# Patient Record
Sex: FEMALE | Race: WHITE | NOT HISPANIC OR LATINO | URBAN - METROPOLITAN AREA
[De-identification: names, ages, dates, MRNs, and addresses within clinical notes are randomized per-mention and may not be internally consistent; named-entity substitution may affect disease eponyms.]

---

## 2017-03-08 ASSESSMENT — MIFFLIN-ST. JEOR: SCORE: 962.46

## 2017-03-09 ENCOUNTER — RECORDS - HEALTHEAST (OUTPATIENT)
Dept: CARDIOLOGY | Facility: CLINIC | Age: 82
End: 2017-03-09

## 2017-03-09 DIAGNOSIS — I50.9 CHF (CONGESTIVE HEART FAILURE) (H): ICD-10-CM

## 2017-03-09 DIAGNOSIS — R23.9 IMPAIRED SKIN INTEGRITY: ICD-10-CM

## 2017-03-09 DIAGNOSIS — R55 SYNCOPE, UNSPECIFIED SYNCOPE TYPE: ICD-10-CM

## 2017-03-09 LAB
AORTIC ROOT: 2.6 CM
AORTIC ROOT: 2.6 CM
AORTIC VALVE MEAN VELOCITY: 79.1 CM/S
ASCENDING AORTA: 3.3 CM
ASCENDING AORTA: 3.3 CM
ATRIAL RATE - MUSE: 127 BPM
AV DIMENSIONLESS INDEX VTI: 0.8
AV MEAN GRADIENT: 3 MMHG
AV PEAK GRADIENT: 5.4 MMHG
AV VALVE AREA: 2.6 CM2
AV VELOCITY RATIO: 0.8
BSA FOR ECHO PROCEDURE: 1.89 M2
CV BLOOD PRESSURE: NORMAL MMHG
CV ECHO HEIGHT: 66 IN
CV ECHO WEIGHT: 169 LBS
DIASTOLIC BLOOD PRESSURE - MUSE: NORMAL MMHG
DOP CALC AO PEAK VEL: 116 CM/S
DOP CALC AO VTI: 22.1 CM
DOP CALC LVOT AREA: 3.46 CM2
DOP CALC LVOT DIAMETER: 2.1 CM
DOP CALC LVOT PEAK VEL: 89.4 CM/S
DOP CALC LVOT STROKE VOLUME: 57.5 CM3
DOP CALCLVOT PEAK VEL VTI: 16.6 CM
ECHO EJECTION FRACTION ESTIMATED: 60 %
EJECTION FRACTION: 56 % (ref 55–75)
FRACTIONAL SHORTENING: 34.8 % (ref 28–44)
INTERPRETATION ECG - MUSE: NORMAL
INTERVENTRICULAR SEPTUM IN END DIASTOLE: 1.5 CM (ref 0.6–0.9)
IVS/PW RATIO: 1.1
LA AREA 1: 24.9 CM2
LA AREA 2: 18.9 CM2
LEFT ATRIUM LENGTH: 4.8 CM
LEFT ATRIUM SIZE: 4 CM
LEFT ATRIUM TO AORTIC ROOT RATIO: 1.54 NO UNITS
LEFT ATRIUM VOLUME INDEX: 44.1 ML/M2
LEFT ATRIUM VOLUME: 83.3 CM3
LEFT VENTRICLE CARDIAC INDEX: 2.2 L/MIN/M2
LEFT VENTRICLE CARDIAC OUTPUT: 4.2 L/MIN
LEFT VENTRICLE DIASTOLIC VOLUME INDEX: 30.2 CM3/M2 (ref 34–74)
LEFT VENTRICLE DIASTOLIC VOLUME: 57 CM3 (ref 46–106)
LEFT VENTRICLE HEART RATE: 73 BPM
LEFT VENTRICLE MASS INDEX: 153.9 G/M2
LEFT VENTRICLE SYSTOLIC VOLUME INDEX: 13.3 CM3/M2 (ref 11–31)
LEFT VENTRICLE SYSTOLIC VOLUME: 25.2 CM3 (ref 14–42)
LEFT VENTRICULAR INTERNAL DIMENSION IN DIASTOLE: 4.91 CM (ref 3.8–5.2)
LEFT VENTRICULAR INTERNAL DIMENSION IN SYSTOLE: 3.2 CM (ref 2.2–3.5)
LEFT VENTRICULAR MASS: 290.9 G
LEFT VENTRICULAR OUTFLOW TRACT MEAN GRADIENT: 2 MMHG
LEFT VENTRICULAR OUTFLOW TRACT MEAN VELOCITY: 59.5 CM/S
LEFT VENTRICULAR OUTFLOW TRACT PEAK GRADIENT: 3 MMHG
LEFT VENTRICULAR POSTERIOR WALL IN END DIASTOLE: 1.35 CM (ref 0.6–0.9)
LV STROKE VOLUME INDEX: 30.4 ML/M2
MITRAL VALVE E/A RATIO: 2.1
MV AVERAGE E/E' RATIO: 11.4 CM/S
MV DECELERATION TIME: 198 MS
MV E'TISSUE VEL-LAT: 11 CM/S
MV E'TISSUE VEL-MED: 5.66 CM/S
MV LATERAL E/E' RATIO: 8.7
MV MEDIAL E/E' RATIO: 16.8
MV PEAK A VELOCITY: 46.4 CM/S
MV PEAK E VELOCITY: 95.2 CM/S
NUC REST DIASTOLIC VOLUME INDEX: 2704 LBS
NUC REST SYSTOLIC VOLUME INDEX: 66 IN
P AXIS - MUSE: NORMAL DEGREES
PR INTERVAL - MUSE: NORMAL MS
QRS DURATION - MUSE: 98 MS
QT - MUSE: 350 MS
QTC - MUSE: 501 MS
R AXIS - MUSE: -10 DEGREES
SYSTOLIC BLOOD PRESSURE - MUSE: NORMAL MMHG
T AXIS - MUSE: 143 DEGREES
TRICUSPID REGURGITATION PEAK PRESSURE GRADIENT: 32.7 MMHG
TRICUSPID VALVE ANULAR PLANE SYSTOLIC EXCURSION: 1.6 CM
TRICUSPID VALVE PEAK REGURGITANT VELOCITY: 286 CM/S
VENTRICULAR RATE- MUSE: 123 BPM

## 2017-03-09 ASSESSMENT — MIFFLIN-ST. JEOR: SCORE: 1198.33

## 2017-03-10 ENCOUNTER — HOME CARE/HOSPICE - HEALTHEAST (OUTPATIENT)
Dept: HOME HEALTH SERVICES | Facility: HOME HEALTH | Age: 82
End: 2017-03-10

## 2017-03-10 ASSESSMENT — MIFFLIN-ST. JEOR: SCORE: 1189.26

## 2021-06-24 VITALS — HEIGHT: 66 IN | BODY MASS INDEX: 26.84 KG/M2 | WEIGHT: 167 LBS

## 2021-06-28 NOTE — PROGRESS NOTES
Call placed to [facility] and also to [facility] and discharge orders and summary faxed to both.

## 2021-06-28 NOTE — DISCHARGE SUMMARY
Physician Discharge Summary        Primary Care Physician:  Julia Abbott MD Admission Date: 3/8/2017   Discharge Provider: Juan Carlos Ybarra Discharge Date:  3/10/2017     Disposition: Final discharge disposition not confirmed Code Status: DNR   Activity: activity as tolerated Diet: cardiac diet   Date of service: 3/10/2017    Consult/s: none    Condition at Discharge: Stable      Discharge Diagnoses:    Principal Problem:    CHF (congestive heart failure)  Active Problems:    Atrial fibrillation with RVR    Vasovagal syncope    Acute respiratory failure with hypoxia    Leucocytosis      Other diagnoses:      Surgery: none    Significant Diagnostic Studies:      Ct Soft Tissue Neck Without Contrast    Result Date: 3/8/2017  [facility] CT SOFT TISSUE NECK WO CONTRAST 3/8/2017 11:22 PM INDICATION: Strangulation. TECHNIQUE: CT neck performed without IV contrast. Axial imaging with coronal and sagittal reconstruction. Dose reduction techniques were used. IV CONTRAST: None. COMPARISON: Head CT of 5/14/2016. No prior dedicated imaging of the soft tissues of the neck. FINDINGS: No definite soft tissue injury in the neck. The hyoid bone is intact. Thyroid cartilage is grossly intact. No definite cervical spine fracture. Visualized lung apices are grossly clear. Evaluation of the soft tissues limited in the absence of IV contrast though there is no definite neck mass or cervical lymphadenopathy. Medial location of both ICAs of the C2-C3 level should not be mistaken for submucosal masses. Visualized intracranial contents demonstrate mild to moderate volume loss though are poorly evaluated. Skull base is grossly unremarkable. Small fluid level in the left sphenoid sinus. Small volume right maxillary sinus. Visualized portions of the orbits  are normal.     CONCLUSION: 1.  No definite soft tissue neck injury. 2.  Within the limits of absence of IV contrast there is no definite neck mass or cervical  lymphadenopathy.    Ct Chest Without Contrast    Result Date: 3/8/2017  CT CHEST WO CONTRAST 3/8/2017 11:26 PM INDICATION: hypoxia TECHNIQUE: Routine chest. Dose reduction techniques were used. IV CONTRAST: None COMPARISON: None. FINDINGS: LUNGS AND PLEURA: Scarring and bronchiectasis of the left lower lobe. Trace bilateral pleural effusions. No pneumothorax. MEDIASTINUM: Moderately enlarged heart. Severe atherosclerotic vascular disease of the aorta and coronary arteries. No abdominal or retroperitoneal lymphadenopathy. LIMITED UPPER ABDOMEN: Renal cysts, the largest measuring 7.5 cm on the right. Cholelithiasis. MUSCULOSKELETAL: Negative.     CONCLUSION: 1.  Moderately enlarged heart. 2.  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 3.  Trace bilateral pleural effusions with dependent atelectasis. 4.  Scarring and bronchiectasis the left lower lobe.        Results from last 7 days  Lab Units 03/09/17  1502 03/08/17  2336   LN-WHITE BLOOD CELL COUNT thou/uL 9.7 15.3*   LN-HEMOGLOBIN g/dL 12.9 14.2   LN-HEMATOCRIT % 39.0 42.5   LN-PLATELET COUNT thou/uL 293 241   LN-NEUTROPHILS RELATIVE PERCENT % 68 83*   LN-MONOCYTES RELATIVE PERCENT % 7 5       Results from last 7 days  Lab Units 03/10/17  0541 03/09/17  0025   LN-INR  2.12* 1.99*       Results from last 7 days  Lab Units 03/10/17  0541 03/09/17  0424 03/08/17  2336   LN-SODIUM mmol/L  --   --  142   LN-POTASSIUM mmol/L 3.9 3.6 3.7   LN-CHLORIDE mmol/L  --   --  107   LN-CO2 mmol/L  --   --  26   LN-BLOOD UREA NITROGEN mg/dL  --   --  17   LN-CREATININE mg/dL  --   --  0.73   LN-CALCIUM mg/dL  --   --  10.0       Hospital Summary:   Reason for admit, for details see H&P.     Hospital course:    Please review inpatient chart for details. In brief summary:  Ania Goode is an 87 y.o. old female brought to the ED by ambulance from assisted living facility for evaluation of loss of consciousness. Patient cannot recall details of the event. She  thinks she was getting up with assistance when she lost consciousness.Per report a lift belt slipped onto her neck are and she lost consciousness, unable to follow commands initially. In the ED she was tachycardic and hypoxic in 80s. She receive a dose of iv lasix but no av gabi blocking agents. Her hypoxia has resolved and heart rate improved.  Her episode is likely vasovagal vs probable accidental neck strangulation. Serial troponin was negative ruled out aCS. ECHO showed normal LVEF, no significant valvular disease. No evidence of infection.  Telemetry was unremarkable except 1 episode of 2 sec pause during sleep. Discussed with cardiology dr Crowley who recommended LEONILA hook up for 1 week to monitor further for arrhythmia and follow up with cardiology in 4 weeks. Pt is stable to discharge home.    PROBLEM LIST:  Syncope. Vasovagal versus probable accidental neck strangulation.   No evidence of arrhythmia on tele, troponin X2 negative to rule out ACS  ECHO unremarkable. LEONILA hook up and cardiology evaluation as outpatient.      ?Sleep apnea and pt should be evaluated for sleep apnea    Atrial fibrillation with rapid ventricular response.   Likely reactive to syncopal event. Resolved without any av gabi blocking agent.   Continue with coumadin. She is not on rate/rhythm control meds at home.       Acute diastolic CHF, hypoxic, elevated bnp, received iv lasix, improved    Leukocytosis. Likely acute demargination, denies respiratory symptoms. CT showed left lower lobe bronchiectasis, pro-calcitonin negative.  UA suggest contaminant,  urine culture confirm contamination.     Acute respiratory failure with hypoxia. Probable related to dependent atelectasis, bronchiectasis and small pleural effusions resolved.      Essential hypertension. Soft blood pressure in the ED, improved, home meds resumed.   Tolerating.      Asthma. Not in exacerbation, Albuterol as needed.  Decubitus pressure ulcer. Wound care consulted.    Type 2 DM, resume metformin at discharge.    Vital Signs in last 24 hours:  Temp:  [97.6  F (36.4  C)-98.5  F (36.9  C)] 98.4  F (36.9  C)  Heart Rate:  [64-80] 71  Resp:  [16-20] 18  BP: (115-170)/(55-72) 115/57    Physical Exam:    Pertinent exam findings on day of discharge include   General- Awake, alert, not in acute distress  HEENT- atraumatic  CHEST- B/L air entry, no wheeze, no crackles  CVS- S1S2, no tachycardia  ABD- Soft, non tender, non distended, BS+  CNS- Awake, alert, conversant, moves extremity grossly  EXT- Warm and perfused, no pedal edema    Discharge Medications:      Medication List      CONTINUE taking these medications          acetaminophen 500 MG tablet   Dose:  1000 mg   Commonly known as:  TYLENOL   1,000 mg, Oral, TID PRN       amLODIPine 5 MG tablet   Dose:  5 mg   Commonly known as:  NORVASC   5 mg, Oral, BID, Morning and Evening       atorvastatin 20 MG tablet   Dose:  20 mg   Commonly known as:  LIPITOR   20 mg, Oral, QPM, At dinner time       blood glucose test strips   Dispense item covered by pt ins. E11.9 NIDDM type II - Test 1 time/day every morning       calcium (as carbonate) 200 mg calcium (500 mg) chewable tablet   Dose:  500 mg   Commonly known as:  TUMS   500 mg, Oral, QAM       cephalexin 250 MG capsule   Dose:  250 mg   Commonly known as:  KEFLEX   250 mg, Oral, QPM       cholecalciferol (vitamin D3) 1,000 unit tablet   Dose:  1000 Units   1,000 Units, Oral, QPM       CITRUCEL (SUCROSE)   Generic drug:  methylcellulose   Take 1 scoop daily for constipation        lisinopril 40 MG tablet   Dose:  40 mg   Commonly known as:  PRINIVIL,ZESTRIL   40 mg, Oral, QAM       metFORMIN 1000 MG tablet   Dose:  1000 mg   Commonly known as:  GLUCOPHAGE   1,000 mg, Oral, BID, Morning and Evening       oxybutynin 10 MG ER tablet   Commonly known as:  DITROPAN XL   Take one tablet twice daily - Morning & Evening       sertraline 100 MG tablet   Dose:  150 mg   Commonly known as:  ZOLOFT    150 mg, Oral, QAM       warfarin 1 MG tablet   Commonly known as:  COUMADIN   Warfarin - See Admin Instructions, 4 mg every Tuesday, 3 mg Mon, Wed, Thurs, Fri, Sat, Sun             Rationale for Medication Changes:   none    Discharge Instructions:  Follow up appointment with Primary Care Physician: Julia Abbott MD in 1 week  Follow up appointment with Specialist:  Cardiology in 4 weeks  Follow up test / procedure to do as outpatient:  LEONILA hook up for 1 week   Consider outpatient sleep evaluation for sleep apnea.    Total time spent more than 30 minutes, with greater than 50% spent in care coordination.    Juan Carlos Ybarra MD,    3/10/2017  11:27 AM

## 2021-06-28 NOTE — PROGRESS NOTES
"Clinical Nutrition Therapy Assessment Note    Reason for Assessment:  nutrition risk screen.    Nutrition History:  Information obtained from patient and chart.  Declo ADA diet prior to admission.   Patient has the following food allergies or intolerances: none reported. From assisted living, family reports gets 3 meals q a day there.    Current Nutrition Prescription and intake:   Diet: cardiac, eating majority at time of visit    Anthropometrics:  Height:  5'6\"  Admit wt: 169#  BMI:27.3  BMI indication: 25-29.9 overweight  Weight History: 173# 2/2/2017    Skin/Wound:  Chase score 16    Physical finding: overweight, stage 1 pressure sore, not open    Medications:  Medications reviewed.    Labs:  Labs reviewed     Estimated Nutrition Needs:  Using 59 kg, with a weight source of ideal weight BW.    Estimated Energy Needs:  > 1475 calories at 25 meghann/kg  Estimated Protein Needs: 59 to 71 grams at 1 to 1.2 grams/kg    Malnutrition Assessment: doesn't meet criteria.    Nutrition Risk Level: low    PES: potential for increased needs r/t wound healing AEB pressure sore.    Intervention/,goal/plan:  Intervention: alter diet to include ADA limits,  Goal: to meet est needs.  Plan: monitor intake, no need for additional protein currently.      See Care Plan for further Problems, Goals, and Interventions.  "

## 2021-06-28 NOTE — PROGRESS NOTES
Pharmacy Note - Admission Medication History    Pertinent Provider Information: None     ______________________________________________________________________    Prior To Admission (PTA) med list completed and updated in EMR.       PTA Med List   Medication Sig Note Last Dose     acetaminophen (TYLENOL) 500 MG tablet Take 1,000 mg by mouth 3 (three) times a day as needed for pain.   Unknown at Unknown time     amLODIPine (NORVASC) 5 MG tablet Take 5 mg by mouth 2 (two) times a day. Morning and Evening  3/8/2017 at PM     atorvastatin (LIPITOR) 20 MG tablet Take 20 mg by mouth every evening. At dinner time  3/8/2017 at PM     blood glucose test strips Dispense item covered by pt ins. E11.9 NIDDM type II - Test 1 time/day every morning  3/8/2017 at AM     calcium, as carbonate, (TUMS) 200 mg calcium (500 mg) chewable tablet Chew 500 mg every morning.   3/8/2017 at AM     cephalexin (KEFLEX) 250 MG capsule Take 250 mg by mouth every evening.   3/8/2017 at PM     cholecalciferol, vitamin D3, 1,000 unit tablet Take 1,000 Units by mouth every evening.   3/8/2017 at PM     lisinopril (PRINIVIL,ZESTRIL) 40 MG tablet Take 40 mg by mouth every morning.   3/8/2017 at AM     metFORMIN (GLUCOPHAGE) 1000 MG tablet Take 1,000 mg by mouth 2 (two) times a day. Morning and Evening  3/8/2017 at PM     methylcellulose (CITRUCEL, SUCROSE,) Take 1 scoop daily for constipation  3/8/2017 at AM     oxybutynin (DITROPAN XL) 10 MG ER tablet Take one tablet twice daily - Morning & Evening  3/8/2017 at PM     sertraline (ZOLOFT) 100 MG tablet Take 150 mg by mouth every morning.   3/8/2017 at AM     warfarin (COUMADIN) 1 MG tablet Take 4 mg every Tuesday, 3 mg Mon, Wed, Thurs, Fri, Sat, Sun 3/9/2017: Last dose taken: 3 mg on 3/8/17  3/8/2017 at PM       Information source(s): Family member, Patient's pharmacy and Clinic records    No prior medication history with [facility]    Patient was asked about OTC/herbal products specifically.  PTA med  list reflects this.    Based on the pharmacist s assessment, the PTA med list information appears reliable    Patient appears compliant: Yes    Allergies were reviewed, assessed, and updated with the patient.      Patient did not bring any medications to the hospital and can't retrieve from home. No multi-dose medications are available for use during hospital stay.     Thank you for the opportunity to participate in the care of this patient.    Milton Corral, Pharmacy Student  3/9/2017 10:45 AM

## 2021-06-28 NOTE — ED PROVIDER NOTES
eMERGENCY dEPARTMENT eNCOUnter      CHIEF COMPLAINT    Chief Complaint   Patient presents with     Shortness of Breath       HPI    Ania Goode is a 87 y.o. female who presents hypoxia and tachycardia.  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  As per EMS notes patient was getting help with machine to stand up from the restroom with machine got caught around her neck patient then had a syncopal episode with possible strangulation.  Patient never lost pulse.  After this incident patient was noted to have hypoxia in the 80s with tachycardia in the 120s 130s was sent to the emergency room for further evaluation.  On arrival in emergency department patient has no active complaints this point in time, patient does state she doesn't remember the incident but can't visibly describe what happened.  Patient denies any chest pain shortness of breath difficulty breathing or pain around her neck.  Patient does have noted history of A. fib currently on Coumadin.    PAST MEDICAL HISTORY    Relevant past surgical history reviewed with patient, unless otherwise stated in HPI, history not pertinent to this visit.    Relevant past medical history reviewed with patient, unless otherwise stated in HPI, history not pertinent to this visit.    CURRENT MEDICATIONS    Patient's Medications    No medications on file       ALLERGIES    Allergies   Allergen Reactions     Ciprofloxacin Myalgia     Severe muscle aches and tendinitis.  She tolerated short course of Cipro 250 mg for UTI, without difficulty.  7/10/2015 Delmy Cox, ALBERTO      Penicillins Unknown     Sulfa (Sulfonamide Antibiotics) Unknown       FAMILY HISTORY    No family history on file.    SOCIAL HISTORY    Social History     Social History     Marital status:      Spouse name: N/A     Number of children: N/A     Years of education: N/A     Social History Main Topics     Smoking status: Not on file     Smokeless tobacco: Not on  "file     Alcohol use Not on file     Drug use: Not on file     Sexual activity: Not on file     Other Topics Concern     Not on file     Social History Narrative     No narrative on file       REVIEW OF SYSTEMS    Constitutional:  Denies fever, chills, weight loss, weakness, loss of appetite,   Eyes:  No pain, diplopia, vision loss,   HENT:  Denies sore throat, ear pain, dysphagia,   Respiratory: No SOB, wheeze, cough,  hemoptysis  Cardiovascular:  No CP, ROJAS, palpitations,   GI:  Denies abdominal pain, nausea, vomiting, dark, bloody stools, diarrhea   Musculoskeletal:  Denies any new muscle/joint pain, swelling or loss of function.   Skin:  Denies rash   Neurologic:  Denies headache, focal weakness,  sensory changes, vertigo, seizure  Lymphatic:  Denies swollen glands     All other systems negative unless noted in HPI.    PHYSICAL EXAM    VITAL SIGNS:   Visit Vitals     BP 96/70     Pulse 90     Temp 97.5  F (36.4  C) (Oral)     Resp 17     Ht 5' 6\" (1.676 m)     Wt 117 lb (53.1 kg)     SpO2 95%     BMI 18.88 kg/m2     General Appearance: Alert, cooperative, mild aphasia at baseline as per her  and facial symmetry,  appears stated age  Head:  Normocephalic, without obvious abnormality, atraumatic  Eyes:  PERRL, conjunctiva/corneas clear, EOM's intact,  ENT:  Lips, mucosa, and tongue normal; teeth and gums normal, no pharyngeal inflammation, no dysphonia or difficulty swallowing, membranes are moist without pallor, nares are normal  Neck:  Supple, symmetrical, trachea midline, no adenopathy;        thyroid:  No enlargement/tenderness/nodules; no carotid    bruit or JVD, no stridor or dysphonia, no soft tissue swelling or tenderness  Chest:  No tenderness or deformity, no crepitus  Cardio:  Regular rate and rhythm, S1 and S2 normal, no murmur, rub    or gallop, 2+ pulses symmetric in all extremities  Pulm:  Clear to auscultation bilaterally, respirations unlabored,   Back:  Symmetric, no curvature, ROM normal, " no CVA tenderness, no spinal tenderness  Abdomen:  Soft, non-tender, non distended, bowel sounds active all four quadrants,     no masses, no organomegaly, no rebound or guarding, no peritoneal signs  Extremities:  Extremities normal, atraumatic, no cyanosis or edema, full function and range of motion, pulses equal in all extremities, normal cap refill, no joint swelling  Skin:  Skin color, texture, no rashes or lesions  Lymph:  Cervical, supraclavicular, and axillary nodes normal  Neuro:  Awake, alert, responsive to voice, follows commands, No gross motor weakness or sensory loss, moves all extremities    LABS  Pertinent lab results reviewed in chart.  Results for orders placed or performed during the hospital encounter of 03/08/17   BNP(B-type Natriuretic Peptide)   Result Value Ref Range     (H) 0 - 167 pg/mL   CK MB   Result Value Ref Range    CK-MB 2 0 - 7 ng/mL   CK Total   Result Value Ref Range    CK, Total 30 30 - 190 U/L   Comprehensive Metabolic Panel   Result Value Ref Range    Sodium 142 136 - 145 mmol/L    Potassium 3.7 3.5 - 5.0 mmol/L    Chloride 107 98 - 107 mmol/L    CO2 26 22 - 31 mmol/L    Anion Gap, Calculation 9 5 - 18 mmol/L    Glucose 145 (H) 70 - 125 mg/dL    BUN 17 8 - 28 mg/dL    Creatinine 0.73 0.60 - 1.10 mg/dL    GFR MDRD Af Amer >60 >60 mL/min/1.73m2    GFR MDRD Non Af Amer >60 >60 mL/min/1.73m2    Bilirubin, Total 0.4 0.0 - 1.0 mg/dL    Calcium 10.0 8.5 - 10.5 mg/dL    Protein, Total 6.8 6.0 - 8.0 g/dL    Albumin 3.5 3.5 - 5.0 g/dL    Alkaline Phosphatase 77 45 - 120 U/L    AST 14 0 - 40 U/L    ALT 10 0 - 45 U/L   Magnesium   Result Value Ref Range    Magnesium 1.2 (L) 1.8 - 2.6 mg/dL   Troponin I   Result Value Ref Range    Troponin I 0.08 0.00 - 0.29 ng/mL   APTT(PTT)   Result Value Ref Range    PTT 29 24 - 37 seconds   INR   Result Value Ref Range    INR 1.99 (H) 0.90 - 1.10   HM1 (CBC with Diff)   Result Value Ref Range    WBC 15.3 (H) 4.0 - 11.0 thou/uL    RBC 4.50 3.80 -  5.40 mill/uL    Hemoglobin 14.2 12.0 - 16.0 g/dL    Hematocrit 42.5 35.0 - 47.0 %    MCV 94 80 - 100 fL    MCH 31.6 27.0 - 34.0 pg    MCHC 33.4 32.0 - 36.0 g/dL    RDW 15.3 (H) 11.0 - 14.5 %    Platelets 241 140 - 440 thou/uL    MPV 10.6 8.5 - 12.5 fL    Neutrophils % 83 (H) 50 - 70 %    Lymphocytes % 10 (L) 20 - 40 %    Monocytes % 5 2 - 10 %    Eosinophils % 1 0 - 6 %    Basophils % 1 0 - 2 %    Neutrophils Absolute 12.6 (H) 2.0 - 7.7 thou/uL    Lymphocytes Absolute 1.6 0.8 - 4.4 thou/uL    Monocytes Absolute 0.8 0.0 - 0.9 thou/uL    Eosinophils Absolute 0.2 0.0 - 0.4 thou/uL    Basophils Absolute 0.1 0.0 - 0.2 thou/uL       EKG  A. fib rate 123    RADIOLOGY  Images independently reviewed.  Ct Soft Tissue Neck Without Contrast    Result Date: 3/8/2017  [facility] CT SOFT TISSUE NECK WO CONTRAST 3/8/2017 11:22 PM INDICATION: Strangulation. TECHNIQUE: CT neck performed without IV contrast. Axial imaging with coronal and sagittal reconstruction. Dose reduction techniques were used. IV CONTRAST: None. COMPARISON: Head CT of 5/14/2016. No prior dedicated imaging of the soft tissues of the neck. FINDINGS: No definite soft tissue injury in the neck. The hyoid bone is intact. Thyroid cartilage is grossly intact. No definite cervical spine fracture. Visualized lung apices are grossly clear. Evaluation of the soft tissues limited in the absence of IV contrast though there is no definite neck mass or cervical lymphadenopathy. Medial location of both ICAs of the C2-C3 level should not be mistaken for submucosal masses. Visualized intracranial contents demonstrate mild to moderate volume loss though are poorly evaluated. Skull base is grossly unremarkable. Small fluid level in the left sphenoid sinus. Small volume right maxillary sinus. Visualized portions of the orbits  are normal.     CONCLUSION: 1.  No definite soft tissue neck injury. 2.  Within the limits of absence of IV contrast there is no definite neck mass  or cervical lymphadenopathy.    Ct Chest Without Contrast    Result Date: 3/8/2017  CT CHEST WO CONTRAST 3/8/2017 11:26 PM INDICATION: hypoxia TECHNIQUE: Routine chest. Dose reduction techniques were used. IV CONTRAST: None COMPARISON: None. FINDINGS: LUNGS AND PLEURA: Scarring and bronchiectasis of the left lower lobe. Trace bilateral pleural effusions. No pneumothorax. MEDIASTINUM: Moderately enlarged heart. Severe atherosclerotic vascular disease of the aorta and coronary arteries. No abdominal or retroperitoneal lymphadenopathy. LIMITED UPPER ABDOMEN: Renal cysts, the largest measuring 7.5 cm on the right. Cholelithiasis. MUSCULOSKELETAL: Negative.     CONCLUSION: 1.  Moderately enlarged heart. 2.  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 3.  Trace bilateral pleural effusions with dependent atelectasis. 4.  Scarring and bronchiectasis the left lower lobe.        PROCEDURES    CRITICAL CARE    ED MEDS  New Prescriptions    No medications on file         ED COURSE & MEDICAL DECISION MAKING    The patient was interviewed and examined.  History in the chart was reviewed.  Patient presents with possible syncopal episode status post regulation.  Patient received CT of the neck as well as chest to rule out possible edema despite patient having no signs of external trauma.  CT was negative for acute pathology.  Patient's labs noted to have mild leukocytosis as well as elevated BNP.  Patient's elevated BNP as possible cause for hypoxia.  Patient was given Lasix emergency department for treatment of acute CHF causing hypoxia.  Patient's rapid A. fib has resolved after giving Lasix with rates in the 96 for patient was not given metoprolol and Cardizem emergency department.  Patient is currently asymptomatic.  Patient also noted to have hypomagnesemia which was repleted in the emergency department.      At the conclusion of the encounter I discussed  the results of all of the tests and the  disposition.   All questions were answered.  The patient acknowledged understanding and was involved in the decision making regarding the overall care plan. I discussed with patient the utility, limitations and findings of the exam/interventions/studies done during this visit as well as the list of differential diagnosis and symptoms to monitor/return to ER for. Verbalizes understanding        PROBLEM LIST   Shortness of breath        FINAL DIAGNOSIS:   1. CHF (congestive heart failure)            Oscar Wilson MD  03/09/17 0101

## 2021-06-28 NOTE — PROGRESS NOTES
Hospitalist Progress Note      Date of Service: 3/9/2017     Brief summary:   Ania Goode is an 87 y.o. old female brought to the ED by ambulance from assisted living facility for evaluation of loss of consciousness.  Patient cannot recall details of the event. She was getting up with an assistant when a lift belt slipped onto her neck are and she lost consciousness, unable to follow commands initially. In the ED she was tachycardic and hypoxic in 80s. She receive a dose of iv lasix but no av gabi blocking agents.    Her hypoxia has resolved and heart rate has improved.       Assessment and Plan     Principal Problem:    CHF (congestive heart failure)  Active Problems:    Atrial fibrillation with RVR    Vasovagal syncope    Acute respiratory failure with hypoxia     Syncope. Vasovagal versus probable accidental neck strangulation.   No evidence of arrhythmia on tele, troponin X2 negative to rule out ACS  Follow echocardiogram to evaluate structure and function. PT/OT evaluation.     Atrial fibrillation with rapid ventricular response. Likely reactive to syncopal event. Resolved without any av gabi blocking agent.   Continue with coumadin. She is not on rate/rhythm control meds at home.        Leukocytosis. Likely acute demargination, denies respiratory symptoms. CT showed left lower lobe bronchiectasis,  pro-calcitonin negative. Monitor temperature curve, UA shows contaminant, no urinary symptoms, urine culture is pending, repeat CBC     Acute respiratory failure with hypoxia. Probable related to dependent atelectasis, bronchiectasis and small pleural effusions.   Improving, encourage Incentive spirometry, Oxygen via nasal cannula to keep saturation above 92%.     Essential hypertension. Soft blood pressure in the ED.   BP is better today, resume home meds at lower dose, monitor blood pressure     Asthma. Not in exacerbation, Albuterol as needed.   Decubitus pressure ulcer. Wound care consult.  Type 2 DM,  "hold metformin, monitor with sSI    DVT prophylaxis: warfarin    PLAN:  PT/OT evaluation. Follow urine culture and cbc. Monitor on telemetry. Remove moe catheter. Resume home meds as appropriate.    Subjective:     Interval History: resting comfortable in bed. Denies chest pain or shortness of breath.    Chart reviewed, events noted. Pt seen and examined.     Objective     Vital signs in last 24 hours:  Temp:  [97.5  F (36.4  C)-98.2  F (36.8  C)] 98.2  F (36.8  C)  Heart Rate:  [] 73  Resp:  [16-30] 20  BP: ()/(55-74) 115/55    Visit Vitals     /55 (Patient Position: Lying)     Pulse 73     Temp 98.2  F (36.8  C) (Oral)     Resp 20     Ht 5' 6\" (1.676 m)     Wt 169 lb (76.7 kg)     SpO2 91%     BMI 27.28 kg/m2       Weight:    169 lb (76.7 kg)  Weight change:   Body mass index is 27.28 kg/(m^2).  BS reviewed     Intake/Output last 3 shifts:   I/O last 3 completed shifts:  In: 120 [P.O.:120]  Out: -   Intake/Output this shift:       O2 Device: Nasal cannula O2 Flow Rate (L/min): 2 L/min    Physical Exam:       General- Alert, cooperative, no apparent distress     HEENT- Atraumatic    Chest- B/L air entry, no wheeze, no crackles, not in distress      CVS- S1S2 irregularly irregular, no tahcycardia    Abd- Soft, non tender, non distended    CNS- Awake, alert, conversant, moves extremity grossly      Ext- B/L no pedal edema        Imaging:  personally reviewed.        Lab Results:  personally reviewed.   Lab Results   Component Value Date     03/08/2017    K 3.6 03/09/2017     03/08/2017    CO2 26 03/08/2017    BUN 17 03/08/2017    CREATININE 0.73 03/08/2017    CALCIUM 10.0 03/08/2017     Lab Results   Component Value Date    WBC 15.3 (H) 03/08/2017    HGB 14.2 03/08/2017    HCT 42.5 03/08/2017    MCV 94 03/08/2017     03/08/2017     Recent Results (from the past 24 hour(s))   ECG 12 lead nursing unit performed    Collection Time: 03/08/17  9:55 PM   Result Value Ref Range    " SYSTOLIC BLOOD PRESSURE  mmHg    DIASTOLIC BLOOD PRESSURE  mmHg    VENTRICULAR RATE 123 BPM    ATRIAL RATE 127 BPM    P-R INTERVAL  ms    QRS DURATION 98 ms    Q-T INTERVAL 350 ms    QTC CALCULATION (BEZET) 501 ms    P Axis  degrees    R AXIS -10 degrees    T AXIS 143 degrees    MUSE DIAGNOSIS       Atrial fibrillation with rapid ventricular response  Marked ST abnormality, possible lateral subendocardial injury  Abnormal ECG  No previous ECGs available  Confirmed by FAIZA FITZGERALD MD LOC: ([id number]) on 3/9/2017 11:05:46 AM     BNP(B-type Natriuretic Peptide)    Collection Time: 03/08/17 11:36 PM   Result Value Ref Range     (H) 0 - 167 pg/mL   CK MB    Collection Time: 03/08/17 11:36 PM   Result Value Ref Range    CK-MB 2 0 - 7 ng/mL   CK Total    Collection Time: 03/08/17 11:36 PM   Result Value Ref Range    CK, Total 30 30 - 190 U/L   Comprehensive Metabolic Panel    Collection Time: 03/08/17 11:36 PM   Result Value Ref Range    Sodium 142 136 - 145 mmol/L    Potassium 3.7 3.5 - 5.0 mmol/L    Chloride 107 98 - 107 mmol/L    CO2 26 22 - 31 mmol/L    Anion Gap, Calculation 9 5 - 18 mmol/L    Glucose 145 (H) 70 - 125 mg/dL    BUN 17 8 - 28 mg/dL    Creatinine 0.73 0.60 - 1.10 mg/dL    GFR MDRD Af Amer >60 >60 mL/min/1.73m2    GFR MDRD Non Af Amer >60 >60 mL/min/1.73m2    Bilirubin, Total 0.4 0.0 - 1.0 mg/dL    Calcium 10.0 8.5 - 10.5 mg/dL    Protein, Total 6.8 6.0 - 8.0 g/dL    Albumin 3.5 3.5 - 5.0 g/dL    Alkaline Phosphatase 77 45 - 120 U/L    AST 14 0 - 40 U/L    ALT 10 0 - 45 U/L   Magnesium    Collection Time: 03/08/17 11:36 PM   Result Value Ref Range    Magnesium 1.2 (L) 1.8 - 2.6 mg/dL   Troponin I    Collection Time: 03/08/17 11:36 PM   Result Value Ref Range    Troponin I 0.08 0.00 - 0.29 ng/mL   HM1 (CBC with Diff)    Collection Time: 03/08/17 11:36 PM   Result Value Ref Range    WBC 15.3 (H) 4.0 - 11.0 thou/uL    RBC 4.50 3.80 - 5.40 mill/uL    Hemoglobin 14.2 12.0 - 16.0 g/dL    Hematocrit  42.5 35.0 - 47.0 %    MCV 94 80 - 100 fL    MCH 31.6 27.0 - 34.0 pg    MCHC 33.4 32.0 - 36.0 g/dL    RDW 15.3 (H) 11.0 - 14.5 %    Platelets 241 140 - 440 thou/uL    MPV 10.6 8.5 - 12.5 fL    Neutrophils % 83 (H) 50 - 70 %    Lymphocytes % 10 (L) 20 - 40 %    Monocytes % 5 2 - 10 %    Eosinophils % 1 0 - 6 %    Basophils % 1 0 - 2 %    Neutrophils Absolute 12.6 (H) 2.0 - 7.7 thou/uL    Lymphocytes Absolute 1.6 0.8 - 4.4 thou/uL    Monocytes Absolute 0.8 0.0 - 0.9 thou/uL    Eosinophils Absolute 0.2 0.0 - 0.4 thou/uL    Basophils Absolute 0.1 0.0 - 0.2 thou/uL   APTT(PTT)    Collection Time: 03/09/17 12:25 AM   Result Value Ref Range    PTT 29 24 - 37 seconds   INR    Collection Time: 03/09/17 12:25 AM   Result Value Ref Range    INR 1.99 (H) 0.90 - 1.10   POCT Glucose    Collection Time: 03/09/17  2:18 AM   Result Value Ref Range    Glucose,  mg/dL   Urinalysis-UC if Indicated    Collection Time: 03/09/17  4:08 AM   Result Value Ref Range    Color, UA Straw Colorless, Yellow, Straw, Light Yellow    Clarity, UA Clear Clear    Glucose, UA Negative Negative    Bilirubin, UA Negative Negative    Ketones, UA Negative Negative    Specific Gravity, UA 1.007 1.001 - 1.030    Blood, UA Negative Negative    pH, UA 5.0 4.5 - 8.0    Protein, UA Negative Negative mg/dL    Urobilinogen, UA <2.0 E.U./dL <2.0 E.U./dL, 2.0 E.U./dL    Nitrite, UA Positive (!) Negative    Leukocytes, UA Large (!) Negative    Bacteria, UA Many (!) None Seen hpf    RBC, UA 0-2 None Seen, 0-2 hpf    WBC, UA 10-25 (!) None Seen, 0-5 hpf    Squam Epithel, UA 25-50 (!) None Seen, 0-5 lpf    WBC Clumps Present (!) None Seen    Mucus, UA Few (!) None Seen lpf    Hyaline Casts, UA 0-5 0-5, None Seen lpf   Troponin I    Collection Time: 03/09/17  4:24 AM   Result Value Ref Range    Troponin I 0.09 0.00 - 0.29 ng/mL   Procalcitonin    Collection Time: 03/09/17  4:24 AM   Result Value Ref Range    Procalcitonin <0.05 0.00 - 0.49 ng/mL   Potassium     Collection Time: 03/09/17  4:24 AM   Result Value Ref Range    Potassium 3.6 3.5 - 5.0 mmol/L   Magnesium    Collection Time: 03/09/17  4:24 AM   Result Value Ref Range    Magnesium 1.7 (L) 1.8 - 2.6 mg/dL   Echo Complete    Collection Time: 03/09/17 12:22 PM   Result Value Ref Range    LV volume diastolic 57 46 - 106 cm3    LV volume systolic 25.2 14 - 42 cm3    IVSd 1.5 0.6 - 0.9 cm    LVIDd 4.91 3.8 - 5.2 cm    LVIDs 3.2 2.2 - 3.5 cm    LVOT diam 2.1 cm    LVOT mean gradient 2 mmHg    LVOT peak VTI 16.6 cm    LVOT mean radha 59.5 cm/s    LVOT peak radha 89.4 cm/s    LVOT peak radha 89.4 cm/s    LVOT peak gradient 3 mmHg    LV PWd 1.35 0.6 - 0.9 cm    MV E' lat radha 11 cm/s    MV E' lat radha 11 cm/s    MV E' med radha 5.66 cm/s    MV E' med radha 5.66 cm/s    AV mean radha 79.1 cm/s    AV mean gradient 3 mmHg    AV VTI 22.1 cm    AV peak radha 116 cm/s    AV peak radha 116 cm/s    AO root 2.6 cm    AO root 2.6 cm    AO ascending 3.3 cm    AO ascending 3.3 cm    LA size 4 cm    LA size 4 cm    LA length 5.5 cm    LA length 4.8 cm    LA/AO root ratio 1.54 no units    MV decel time 198 ms    MV decel time 198 ms    MV peak A radha 46.4 cm/s    MV peak A radha 46.4 cm/s    MV peak E radha 95.2 cm/s    MV peak E radha 95.2 cm/s    TR peak radha 286 cm/s    LA area 2 18.9 cm2    LA area 2 18.9 cm2    LA area 1 24.9 cm2    LA area 1 24.9 cm2    BSA 1.89 m2    Hieght 66 in    Weight 2704 lbs    /55 mmHg    HR 73 bpm           Advanced Care Planning:     Barriers to discharge: syncope, pending culture, PT/OT eval, pending cardiac workup, not ready yet  Anticipated discharge day:  1-2 days  Discussed with patient, daughter Kimberly over the phone, care manager  Disposition: TBD    Total time spent in direct patient care is greater than 35 minutes with more than 50% of time spent in counseling and coordination of care with patient and family, in face to face contact, coordination with staff, and medication adjustments.    Juan Carlos Ybarra,  MD  [facility]  Hospitalist  Pager [phone]

## 2021-06-28 NOTE — PROGRESS NOTES
Pharmacy Consult: Warfarin Management    Pharmacy consulted to dose warfarin for Ania Goode, a 87 y.o. female    Ordering provider: Juan Carlos Ybarra MD    Reason for warfarin therapy: Atrial Fibrillation and VTE Prophylaxis  Goal INR Range: 2-3    Subjective  Medication lists (home and hospital) were reviewed.    New medications that may increase bleeding risk/INR: None    Restarted home medications that may increase bleeding risk/INR: Sertraline, Acetaminophen     Home Warfarin Dosin mg every Tuesday, 3 mg every Mon, Wed, Thurs, Fri, Sat, Sun    Other Anticoagulants: None  Patient being bridged: No    Patient Active Problem List   Diagnosis     CHF (congestive heart failure)     Atrial fibrillation with RVR     Vasovagal syncope     Acute respiratory failure with hypoxia     Leucocytosis    No past medical history on file.     Social History   Substance Use Topics     Smoking status: Never Smoker     Smokeless tobacco: Not on file     Alcohol use No       Objective   Labs:  Last 3 days:    Recent Labs      17   2336   CREATININE  0.73   HGB  14.2   HCT  42.5   PLT  241   BILITOT  0.4   AST  14   ALT  10   ALKPHOS  77     Last 7 days:   Recent labs: (last 7 days)      17   0025   INR  1.99*       Warfarin Dosing History:    Date INR Warfarin Dose Comment   3/8/17 Unknown 3 mg Home dose   3/9/17 1.99 3 mg                  Assessment  The patient is continuing warfarin for the indication of Atrial Fibrillation and VTE Prophylaxis with a goal INR of 2-3. INR today is subtherapeutic.  Because the patient's INR is almost at goal, continue home dosing.    Plan  1. Administer warfarin 3 mg PO today.  2. Check INR daily or as appropriate.  3. Continue to follow the patient's INR, PLT, and HGB as available.  4. Monitor for potential drug/disease interactions.    Thank you for the consult,  Milton Corral, Pharmacy Student 3/9/2017 1:52 PM

## 2021-06-28 NOTE — H&P
Admission History and Physical   Ania Goode,  1929, MRN [id number]    [facility]  Principal Problem:    CHF (congestive heart failure)      PCP: Julia Abbott MD, [phone]   Code status:  DNR       Extended Emergency Contact Information  Primary Emergency Contact: Saúl Goode  Address: [address] [place], [place] [id number] [facility]  Home Phone: [phone]  Mobile Phone: [phone]  Relation: Spouse  Secondary Emergency Contact: Kimberly Roa   [facility]  Home Phone: [phone]  Mobile Phone: [phone]  Relation: Child       Day of Service: 2017.    Assessment and Plan   1.  Syncope.  Vasovagal versus probable accidental neck strangulation.  Monitor on telemetry for arrhythmias, check troponin.  Echocardiogram to evaluate structure and function.  2.  Atrial fibrillation with rapid ventricular response.  Likely reactive to syncopal event.  Reconcile outpatient medications including anticoagulation.  3.  Leukocytosis.  Likely acute demargination, denies respiratory symptoms.  CT showed left lower lobe bronchiectasis, check pro-calcitonin.  Monitor temperature curve, check UA.  4.  Acute respiratory failure with hypoxia.  Probable related to dependent atelectasis, bronchiectasis and small pleural effusions.  Oxygen via nasal cannula to keep saturation above 92%.  5.  Essential hypertension.  Soft blood pressure in the ED.  Monitor.  6.  Asthma.  Albuterol as needed.  7.  Decubitus pressure ulcer.  Wound care consult.    Disposition: Cardiac telemetry, observation.  DVT prophylaxis: Warfarin.  Code status: DNR.       Chief Complaint:   unresponsiveness      HPI:    Ania Goode is an 87 y.o. old female brought to the ED by ambulance from assisted living facility for evaluation of loss of consciousness.  History is obtained from patient, and electronic records.  She is now oriented but cannot recall the details of  the event.  She thinks was getting an assistant in the bathroom.  Per report, a lift belt slipped onto her neck area and she lost consciousness, unable to follow commands initially.  In the ED, she was tachycardic with oxygen saturation in the 80s.  She states was treated for bronchitis about a month ago and denies chest pain, palpitations or shortness of breath.  She has had limited mobility since last year after surgery on her lower extremities.         Medical History  There are no active non-hospital problems to display for this patient.    No past medical history on file.     Surgical History  She  has no past surgical history on file.       Social History  Reviewed, and denies tobacco, alcohol, drugs.     Allergies  Allergies   Allergen Reactions     Ciprofloxacin Myalgia     Severe muscle aches and tendinitis.  She tolerated short course of Cipro 250 mg for UTI, without difficulty.  7/10/2015 Delmy Cox, ALBERTO      Penicillins Unknown     Sulfa (Sulfonamide Antibiotics) Unknown    Family History  Reviewed, and family history is not on file.           Prior to Admission Medications   No prescriptions prior to admission.          Review of Systems:  All system negative except as mentioned in the HPI.      Physical Exam:  Vitals:    03/09/17 0204   BP: 115/65   Pulse: (!) 101   Resp: 18   Temp: 97.8  F (36.6  C)   SpO2: 95%     169 lb (76.7 kg)  Body mass index is 27.28 kg/(m^2).    General: In no acute distress.  HEENT: Normocephalic, atraumatic.  Neck: No signs or marks of trauma.  No JVD.  Heart: Irregular.  Lungs: Clear to auscultation.  Abdomen: Soft, nontender.  Extremities: No edema.  Skin: Pale, warm.  Neuro: Somnolent, oriented, following commands.  Psychiatric: Normal affect.      Pertinent Labs  Lab Results: Personally reviewed.  Results for orders placed or performed during the hospital encounter of 03/08/17   BNP(B-type Natriuretic Peptide)   Result Value Ref Range     (H) 0 - 167 pg/mL    CK MB   Result Value Ref Range    CK-MB 2 0 - 7 ng/mL   CK Total   Result Value Ref Range    CK, Total 30 30 - 190 U/L   Comprehensive Metabolic Panel   Result Value Ref Range    Sodium 142 136 - 145 mmol/L    Potassium 3.7 3.5 - 5.0 mmol/L    Chloride 107 98 - 107 mmol/L    CO2 26 22 - 31 mmol/L    Anion Gap, Calculation 9 5 - 18 mmol/L    Glucose 145 (H) 70 - 125 mg/dL    BUN 17 8 - 28 mg/dL    Creatinine 0.73 0.60 - 1.10 mg/dL    GFR MDRD Af Amer >60 >60 mL/min/1.73m2    GFR MDRD Non Af Amer >60 >60 mL/min/1.73m2    Bilirubin, Total 0.4 0.0 - 1.0 mg/dL    Calcium 10.0 8.5 - 10.5 mg/dL    Protein, Total 6.8 6.0 - 8.0 g/dL    Albumin 3.5 3.5 - 5.0 g/dL    Alkaline Phosphatase 77 45 - 120 U/L    AST 14 0 - 40 U/L    ALT 10 0 - 45 U/L   Magnesium   Result Value Ref Range    Magnesium 1.2 (L) 1.8 - 2.6 mg/dL   Troponin I   Result Value Ref Range    Troponin I 0.08 0.00 - 0.29 ng/mL   APTT(PTT)   Result Value Ref Range    PTT 29 24 - 37 seconds   INR   Result Value Ref Range    INR 1.99 (H) 0.90 - 1.10   HM1 (CBC with Diff)   Result Value Ref Range    WBC 15.3 (H) 4.0 - 11.0 thou/uL    RBC 4.50 3.80 - 5.40 mill/uL    Hemoglobin 14.2 12.0 - 16.0 g/dL    Hematocrit 42.5 35.0 - 47.0 %    MCV 94 80 - 100 fL    MCH 31.6 27.0 - 34.0 pg    MCHC 33.4 32.0 - 36.0 g/dL    RDW 15.3 (H) 11.0 - 14.5 %    Platelets 241 140 - 440 thou/uL    MPV 10.6 8.5 - 12.5 fL    Neutrophils % 83 (H) 50 - 70 %    Lymphocytes % 10 (L) 20 - 40 %    Monocytes % 5 2 - 10 %    Eosinophils % 1 0 - 6 %    Basophils % 1 0 - 2 %    Neutrophils Absolute 12.6 (H) 2.0 - 7.7 thou/uL    Lymphocytes Absolute 1.6 0.8 - 4.4 thou/uL    Monocytes Absolute 0.8 0.0 - 0.9 thou/uL    Eosinophils Absolute 0.2 0.0 - 0.4 thou/uL    Basophils Absolute 0.1 0.0 - 0.2 thou/uL   POCT Glucose   Result Value Ref Range    Glucose,  mg/dL           Pertinent Radiology   Radiology Results: Personally reviewed.  [facility]  CT SOFT TISSUE NECK WO  CONTRAST  3/8/2017 11:22 PM     INDICATION: Strangulation.  TECHNIQUE: CT neck performed without IV contrast. Axial imaging with coronal and sagittal reconstruction. Dose reduction techniques were used.  IV CONTRAST: None.  COMPARISON: Head CT of 5/14/2016. No prior dedicated imaging of the soft tissues of the neck.     FINDINGS: No definite soft tissue injury in the neck. The hyoid bone is intact. Thyroid cartilage is grossly intact. No definite cervical spine fracture. Visualized lung apices are grossly clear. Evaluation of the soft tissues limited in the absence of   IV contrast though there is no definite neck mass or cervical lymphadenopathy. Medial location of both ICAs of the C2-C3 level should not be mistaken for submucosal masses.     Visualized intracranial contents demonstrate mild to moderate volume loss though are poorly evaluated. Skull base is grossly unremarkable. Small fluid level in the left sphenoid sinus. Small volume right maxillary sinus. Visualized portions of the orbits  are normal.     IMPRESSION:   CONCLUSION:   1. No definite soft tissue neck injury.     2. Within the limits of absence of IV contrast there is no definite neck mass or cervical lymphadenopathy.      CT CHEST WO CONTRAST  3/8/2017 11:26 PM     INDICATION: hypoxia  TECHNIQUE: Routine chest. Dose reduction techniques were used.  IV CONTRAST: None  COMPARISON: None.     FINDINGS:  LUNGS AND PLEURA: Scarring and bronchiectasis of the left lower lobe. Trace bilateral pleural effusions. No pneumothorax.     MEDIASTINUM: Moderately enlarged heart. Severe atherosclerotic vascular disease of the aorta and coronary arteries. No abdominal or retroperitoneal lymphadenopathy.     LIMITED UPPER ABDOMEN: Renal cysts, the largest measuring 7.5 cm on the right. Cholelithiasis.     MUSCULOSKELETAL: Negative.     IMPRESSION:   CONCLUSION:  1. Moderately enlarged heart.  2. 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  3.  Trace bilateral pleural effusions with dependent atelectasis.  4. Scarring and bronchiectasis the left lower lobe.         EKG Results: Atrial fibrillation with rapid ventricular response 123 BPM, nonspecific T waves.  No previous EKG to compare.      Advanced Care Planning  Disposition: Observation.  Time spent was 50 minutes were more than 50% was in counseling and or coordination of cares.

## 2021-06-28 NOTE — PROGRESS NOTES
Spiritual Care Note    Summary of Visit:   I introduced myself and Spiritual Care to the patient, her  and daughter. They talked about how he is a former  and where he went to school. They requested prayer, which we did.    Spiritual Care Plan:   available for spiritual care or emotional support as needed.     Chaplain Cj, MA